# Patient Record
Sex: FEMALE | Race: WHITE | NOT HISPANIC OR LATINO | URBAN - METROPOLITAN AREA
[De-identification: names, ages, dates, MRNs, and addresses within clinical notes are randomized per-mention and may not be internally consistent; named-entity substitution may affect disease eponyms.]

---

## 2020-07-15 ENCOUNTER — OFFICE VISIT (OUTPATIENT)
Dept: URBAN - METROPOLITAN AREA CLINIC 54 | Facility: CLINIC | Age: 75
End: 2020-07-15

## 2020-08-13 ENCOUNTER — OFFICE VISIT (OUTPATIENT)
Dept: URBAN - METROPOLITAN AREA CLINIC 54 | Facility: CLINIC | Age: 75
End: 2020-08-13
Payer: MEDICARE

## 2020-08-13 ENCOUNTER — WEB ENCOUNTER (OUTPATIENT)
Dept: URBAN - METROPOLITAN AREA CLINIC 54 | Facility: CLINIC | Age: 75
End: 2020-08-13

## 2020-08-13 DIAGNOSIS — K58.9 IBS (IRRITABLE BOWEL SYNDROME) DIARRHEA PREDOMINANT: ICD-10-CM

## 2020-08-13 DIAGNOSIS — K63.5 COLON POLYPS: ICD-10-CM

## 2020-08-13 DIAGNOSIS — K22.70 BARRETTS ESOPHAGUS: ICD-10-CM

## 2020-08-13 PROCEDURE — 1036F TOBACCO NON-USER: CPT | Performed by: INTERNAL MEDICINE

## 2020-08-13 PROCEDURE — G8420 CALC BMI NORM PARAMETERS: HCPCS | Performed by: INTERNAL MEDICINE

## 2020-08-13 PROCEDURE — G8427 DOCREV CUR MEDS BY ELIG CLIN: HCPCS | Performed by: INTERNAL MEDICINE

## 2020-08-13 PROCEDURE — 99213 OFFICE O/P EST LOW 20 MIN: CPT | Performed by: INTERNAL MEDICINE

## 2020-08-13 PROCEDURE — 3017F COLORECTAL CA SCREEN DOC REV: CPT | Performed by: INTERNAL MEDICINE

## 2020-08-13 RX ORDER — SACCHAROMYCES BOULARDII 250 MG
CAPSULE ORAL
Qty: 0 | Refills: 0 | Status: ACTIVE | COMMUNITY
Start: 1900-01-01

## 2020-08-13 RX ORDER — ACETAMINOPHEN 500 MG/1
TABLET, FILM COATED ORAL
Qty: 0 | Refills: 0 | Status: ACTIVE | COMMUNITY
Start: 1900-01-01

## 2020-08-13 RX ORDER — BUDESONIDE 3 MG/1
CAPSULE, COATED PELLETS ORAL
Qty: 0 | Refills: 0 | Status: ACTIVE | COMMUNITY
Start: 1900-01-01

## 2020-08-13 RX ORDER — ZOLPIDEM TARTRATE 10 MG/1
TAKE 1 TABLET (10 MG) BY ORAL ROUTE ONCE DAILY AT BEDTIME TABLET, FILM COATED ORAL 1
Qty: 0 | Refills: 0 | Status: ACTIVE | COMMUNITY
Start: 1900-01-01

## 2020-08-13 RX ORDER — IPRATROPIUM BROMIDE 0.5 MG/2.5ML
SOLUTION RESPIRATORY (INHALATION)
Qty: 0 | Refills: 0 | Status: ACTIVE | COMMUNITY
Start: 1900-01-01

## 2020-08-13 RX ORDER — DICLOFENAC SODIUM 10 MG/G
GEL TOPICAL
Qty: 0 | Refills: 0 | Status: ACTIVE | COMMUNITY
Start: 1900-01-01

## 2020-08-13 RX ORDER — AMILORIDE HYDROCHLORIDE 5 MG/1
TAKE 1 TABLET (5 MG) BY ORAL ROUTE ONCE DAILY WITH FOOD TABLET ORAL 1
Qty: 0 | Refills: 0 | Status: ACTIVE | COMMUNITY
Start: 1900-01-01

## 2020-08-13 NOTE — HPI-TODAY'S VISIT:
This is a 74-year-old female who presents in follow-up for chronic diarrhea and history of Hernandez's esophagus.  She received notification from our office that an upper endoscopy was due.  He has had colon polyps in the past although her last colonoscopy in 2017 was clear of polyps and a follow-up colonoscopy recommended for 2023.  At this time she has no heartburn and is not taking acid reducing medication.  She has no dysphagia.  Her diarrhea is well controlled with cholestyramine.  She does have occasional episodes of urinary and once in a while fecal incontinence.  An InterStim device is been placed several years ago and replaced recently and is working well for both incontinence types.  She has had no rectal bleeding.  The colon biopsies and ileal biopsies were negative.  He does not use antidiarrheal drugs typically.  She does have a history of C. difficile on 2 occasions in the past and is undergone fecal transplant for management of this.  She has had no recurrence of C. difficile and has not recently been on antibiotics.  Her weight is been stable. She has a history of mitral stenosis and underwent bovine valve replacement a number of years ago.  She also has atrial fibrillation and is on chronic warfarin therapy.  She has interrupted warfarin in the past procedures.  She has no history of chest pain or congestive heart failure and does not have a cardiac pacemaker.  She has no history of coronavirus.

## 2020-08-13 NOTE — HPI-OTHER HISTORIES
>>prior visits  1/7/20-PATIENT PRESENTS FOR FOLLOW UP. SHE STATES SHE HAS INTERMITTENT CHEST PAIN LOCATED IN THE LEFT CHEST. PAIN LAST FEW MINUTES THEN GOES AWAY. IT IS INTERMITTENT. NO ASSOCIATED HEARTBURN OR INDIGESTION. DENIES S.O.B. DENIES ALARM SYMPTOMS.   June 4, 2018 Dr Lesvia Henao presents after being admitted at [facility] for her diarrhea when [facility] was closed on the weekend.  While admitted she developed constipation.  Stool work up done at the hosptial revealed negative for all pathogens and WBC.  She now feels that the diarrhea was due to stress related to caring for  who has multiple medical issues.  She now has formed explosive stools with mucous in the stool.  She relates her symptoms to dietary indiscretion with fatty dessert given her history of being post cholecystectomy.  She has been taking Florastor probiotic daily since 2012.  If she takes an antibiotic she doubles her dose.   May 24, 2018 Dr Lesvia Henao reports that today she had urgency with a green stool that was watery with no blood seen.  She has a history of resistant C. Diff.  She recently has taken prednisone for an eye infection with Z pack.  She takes Florastor daily.  She has also been on doxycycline.  She denies fever, chills, nausea or emesis.  She is on an albuterol inhaler due to bronchitis that has complicated her asthma.   FOLLOW UP 5/23/17- Dr. Tyler PATIENT PRESENTS FOR FOLLOW UP. SHE STATES HER SYMPTOMS ARE IMPROVED WITH IBGARD BID AND FLORASTOR. SHE ALSO NOTICED IMPROVEMENTS WHEN SHE USED THE FODMAP DIET. FOLLOW UP 2/23/17- Dr. Tyler PATIENT PRESENTS FOR FOLLOW UP. SHE IS DOING WELL. SHE HAD COLONOSCOPY WITH RANDOM BIOPSIES SHOWING NORMAL T.I. AND COLON. SHE HAD EXTERNAL HEMRRHOIDS. HER STOOL STUDIES (FECAL CALPROTECTIN, STOOL C.DIFF, STOOL C&S-ALL NORMAL).  1 /26/2017- Dr. Tyler The patient is a 71 year old /White female, who presents on referral from Steve Treadwell MD, for a gastroenterology evaluation for diarrhea which has been explosive , postprandial, and watery, and she has been having about 10 bowel movements per day when the symptoms occur. A copy of this document will be sent to the referring provider. The diarrhea has been present daily since it began approximately 5 years ago. No night time symptoms, but she does have accidents and urgency. Diarrhea is worse with eating and associated with tenesmus and mucus. She denies any alarm symptoms. She was recently in hospital with cardiac clearance with cath for chest pain. Besides diarrhea , the patient complains of no other GI tract problems. She has not traveled out of the country. The patient states no additional potential causes. Diagnostic Studies: Diagnostic Studies have not been done.

## 2022-01-19 ENCOUNTER — OFFICE VISIT (OUTPATIENT)
Dept: URBAN - METROPOLITAN AREA CLINIC 54 | Facility: CLINIC | Age: 77
End: 2022-01-19

## 2022-01-19 RX ORDER — ACETAMINOPHEN 500 MG/1
TABLET, FILM COATED ORAL
Qty: 0 | Refills: 0 | Status: ACTIVE | COMMUNITY
Start: 1900-01-01

## 2022-01-19 RX ORDER — SACCHAROMYCES BOULARDII 250 MG
CAPSULE ORAL
Qty: 0 | Refills: 0 | Status: ACTIVE | COMMUNITY
Start: 1900-01-01

## 2022-01-19 RX ORDER — IPRATROPIUM BROMIDE 0.5 MG/2.5ML
SOLUTION RESPIRATORY (INHALATION)
Qty: 0 | Refills: 0 | Status: ACTIVE | COMMUNITY
Start: 1900-01-01

## 2022-01-19 RX ORDER — ZOLPIDEM TARTRATE 10 MG/1
TAKE 1 TABLET (10 MG) BY ORAL ROUTE ONCE DAILY AT BEDTIME TABLET, FILM COATED ORAL 1
Qty: 0 | Refills: 0 | Status: ACTIVE | COMMUNITY
Start: 1900-01-01

## 2022-01-19 RX ORDER — AMILORIDE HYDROCHLORIDE 5 MG/1
TAKE 1 TABLET (5 MG) BY ORAL ROUTE ONCE DAILY WITH FOOD TABLET ORAL 1
Qty: 0 | Refills: 0 | Status: ACTIVE | COMMUNITY
Start: 1900-01-01

## 2022-01-19 RX ORDER — DICLOFENAC SODIUM 10 MG/G
GEL TOPICAL
Qty: 0 | Refills: 0 | Status: ACTIVE | COMMUNITY
Start: 1900-01-01

## 2022-01-19 RX ORDER — BUDESONIDE 3 MG/1
CAPSULE, COATED PELLETS ORAL
Qty: 0 | Refills: 0 | Status: ACTIVE | COMMUNITY
Start: 1900-01-01

## 2022-04-19 ENCOUNTER — TELEPHONE ENCOUNTER (OUTPATIENT)
Dept: URBAN - METROPOLITAN AREA CLINIC 54 | Facility: CLINIC | Age: 77
End: 2022-04-19

## 2022-04-21 ENCOUNTER — TELEPHONE ENCOUNTER (OUTPATIENT)
Dept: URBAN - METROPOLITAN AREA CLINIC 54 | Facility: CLINIC | Age: 77
End: 2022-04-21

## 2022-04-26 LAB — C DIFFICILE TOXINS A+B, EIA: NEGATIVE

## 2022-04-27 ENCOUNTER — OFFICE VISIT (OUTPATIENT)
Dept: URBAN - METROPOLITAN AREA CLINIC 54 | Facility: CLINIC | Age: 77
End: 2022-04-27
Payer: MEDICARE

## 2022-04-27 ENCOUNTER — WEB ENCOUNTER (OUTPATIENT)
Dept: URBAN - METROPOLITAN AREA CLINIC 54 | Facility: CLINIC | Age: 77
End: 2022-04-27

## 2022-04-27 DIAGNOSIS — K21.9 GERD (GASTROESOPHAGEAL REFLUX DISEASE): ICD-10-CM

## 2022-04-27 DIAGNOSIS — K58.9 IRRITABLE BOWEL SYNDROME: ICD-10-CM

## 2022-04-27 PROCEDURE — 99213 OFFICE O/P EST LOW 20 MIN: CPT | Performed by: INTERNAL MEDICINE

## 2022-04-27 RX ORDER — ACETAMINOPHEN 500 MG/1
TABLET, FILM COATED ORAL
Qty: 0 | Refills: 0 | Status: ACTIVE | COMMUNITY
Start: 1900-01-01

## 2022-04-27 RX ORDER — DICLOFENAC SODIUM 10 MG/G
GEL TOPICAL
Qty: 0 | Refills: 0 | Status: ACTIVE | COMMUNITY
Start: 1900-01-01

## 2022-04-27 RX ORDER — ZOLPIDEM TARTRATE 10 MG/1
TAKE 1 TABLET (10 MG) BY ORAL ROUTE ONCE DAILY AT BEDTIME TABLET, FILM COATED ORAL 1
Qty: 0 | Refills: 0 | Status: ACTIVE | COMMUNITY
Start: 1900-01-01

## 2022-04-27 RX ORDER — SACCHAROMYCES BOULARDII 250 MG
CAPSULE ORAL
Qty: 0 | Refills: 0 | Status: ACTIVE | COMMUNITY
Start: 1900-01-01

## 2022-04-27 RX ORDER — WARFARIN SODIUM 5 MG/1
1 TABLET TABLET ORAL ONCE A DAY
Status: ACTIVE | COMMUNITY

## 2022-04-27 RX ORDER — BUDESONIDE 3 MG/1
CAPSULE, COATED PELLETS ORAL
Qty: 0 | Refills: 0 | Status: ACTIVE | COMMUNITY
Start: 1900-01-01

## 2022-04-27 RX ORDER — IPRATROPIUM BROMIDE 0.5 MG/2.5ML
SOLUTION RESPIRATORY (INHALATION)
Qty: 0 | Refills: 0 | Status: ACTIVE | COMMUNITY
Start: 1900-01-01

## 2022-04-27 NOTE — PHYSICAL EXAM CONSTITUTIONAL:
well developed, well nourished , in no acute distress , ambulating without difficulty , normal communication ability
(0) No visual loss

## 2022-04-27 NOTE — HPI-TODAY'S VISIT:
76-year-old female with prior C. difficile and recent diarrhea that was fairly severe but has resolved with the addition of cholestyramine.  The odor and consistency was different than she had previously experienced with C. difficile.  She had to fecal transplants for C. difficile in the past.  Currently her bowels are moving regularly.  She is advised to take the Questran separately from her other medications.  She has no rectal bleeding.  Her last colonoscopy in 2017 was normal with no polyps although she had had polyps in the past.  Her last upper endoscopy in 2016 did not reveal Hernandez's esophagus and previously had been diagnosed.  A biopsy was also negative then.  She has no dysphagia and no heartburn.  Other medical problems include asthma that is fairly well controlled currently atrial fibrillation, antiphospholipid syndrome and mitral valve (bovine) she is on chronic warfarin therapy for these indications.  Her heart remained stable.

## 2022-05-02 ENCOUNTER — TELEPHONE ENCOUNTER (OUTPATIENT)
Dept: URBAN - METROPOLITAN AREA CLINIC 54 | Facility: CLINIC | Age: 77
End: 2022-05-02

## 2022-05-03 ENCOUNTER — TELEPHONE ENCOUNTER (OUTPATIENT)
Dept: URBAN - METROPOLITAN AREA CLINIC 92 | Facility: CLINIC | Age: 77
End: 2022-05-03

## 2022-10-17 ENCOUNTER — OFFICE VISIT (OUTPATIENT)
Dept: URBAN - METROPOLITAN AREA CLINIC 54 | Facility: CLINIC | Age: 77
End: 2022-10-17
Payer: MEDICARE

## 2022-10-17 ENCOUNTER — DASHBOARD ENCOUNTERS (OUTPATIENT)
Age: 77
End: 2022-10-17

## 2022-10-17 VITALS
WEIGHT: 152.4 LBS | DIASTOLIC BLOOD PRESSURE: 61 MMHG | SYSTOLIC BLOOD PRESSURE: 107 MMHG | HEIGHT: 61 IN | BODY MASS INDEX: 28.77 KG/M2 | TEMPERATURE: 97.4 F | HEART RATE: 74 BPM

## 2022-10-17 DIAGNOSIS — Z86.010 HISTORY OF COLON POLYPS: ICD-10-CM

## 2022-10-17 DIAGNOSIS — K21.9 GERD (GASTROESOPHAGEAL REFLUX DISEASE): ICD-10-CM

## 2022-10-17 DIAGNOSIS — K58.9 IRRITABLE BOWEL SYNDROME: ICD-10-CM

## 2022-10-17 DIAGNOSIS — R15.9 INCONTINENCE OF FECES, UNSPECIFIED FECAL INCONTINENCE TYPE: ICD-10-CM

## 2022-10-17 DIAGNOSIS — Z79.01 WARFARIN ANTICOAGULATION: ICD-10-CM

## 2022-10-17 DIAGNOSIS — R19.7 DIARRHEA, UNSPECIFIED TYPE: ICD-10-CM

## 2022-10-17 PROBLEM — 72042002: Status: ACTIVE | Noted: 2022-10-17

## 2022-10-17 PROBLEM — 428283002: Status: ACTIVE | Noted: 2022-10-17

## 2022-10-17 PROBLEM — 10191004: Status: ACTIVE | Noted: 2022-10-17

## 2022-10-17 PROCEDURE — 99214 OFFICE O/P EST MOD 30 MIN: CPT

## 2022-10-17 RX ORDER — DICLOFENAC SODIUM 10 MG/G
GEL TOPICAL
Qty: 0 | Refills: 0 | Status: ACTIVE | COMMUNITY
Start: 1900-01-01

## 2022-10-17 RX ORDER — SODIUM, POTASSIUM,MAG SULFATES 17.5-3.13G
TAKE 354 ML AS DIRECTED SOLUTION, RECONSTITUTED, ORAL ORAL
Qty: 354 ML | Refills: 0 | OUTPATIENT
Start: 2022-10-17 | End: 2022-10-18

## 2022-10-17 RX ORDER — WARFARIN SODIUM 5 MG/1
1 TABLET TABLET ORAL ONCE A DAY
Status: ACTIVE | COMMUNITY

## 2022-10-17 NOTE — HPI-TODAY'S VISIT:
4/27/22: 76-year-old female with prior C. difficile and recent diarrhea that was fairly severe but has resolved with the addition of cholestyramine.  The odor and consistency was different than she had previously experienced with C. difficile.  She had to fecal transplants for C. difficile in the past.  Currently her bowels are moving regularly.  She is advised to take the Questran separately from her other medications.  She has no rectal bleeding.  Her last colonoscopy in 2017 was normal with no polyps although she had had polyps in the past.  Her last upper endoscopy in 2016 did not reveal Hernandez's esophagus and previously had been diagnosed.  A biopsy was also negative then.  She has no dysphagia and no heartburn.  Other medical problems include asthma that is fairly well controlled currently atrial fibrillation, antiphospholipid syndrome and mitral valve (bovine) she is on chronic warfarin therapy for these indications.  Her heart remained stable.  10/17/22: Pt is a 78 yo female with PMH of antiphospholipid syndrome, DVT, and Afib on Warfarin, C diff, colon polyps, and Hernandez's resolved on last EGD who presents for EGD and colon consult. Pt states she has had some breakthrough acid reflux recently. Feels this may have been from eating chocolate. She does not currently take an anti reflux medication and controls GERD with diet. Her last EGD was over 5 years ago. Pt also states she is due for cscope as she has hx of polyps and last cscope was in 2017. Pt reports chronic diarrhea and urge incontinence of liquid feces at least once a week x 10 years. Cholestyramine helps. Seeing urologist for urinary incontinence. Denies melena, rectal bleeding, abdominal pain, or unintentional weight loss. No family history of colon cancer, no history of anesthesia problems. Recent labs showed leukopenia, seeing heme. Otherwise unremarkable.

## 2022-12-21 ENCOUNTER — TELEPHONE ENCOUNTER (OUTPATIENT)
Dept: URBAN - METROPOLITAN AREA CLINIC 54 | Facility: CLINIC | Age: 77
End: 2022-12-21

## 2023-01-10 ENCOUNTER — TELEPHONE ENCOUNTER (OUTPATIENT)
Dept: URBAN - METROPOLITAN AREA CLINIC 54 | Facility: CLINIC | Age: 78
End: 2023-01-10

## 2024-07-03 ENCOUNTER — TELEPHONE ENCOUNTER (OUTPATIENT)
Dept: URBAN - METROPOLITAN AREA CLINIC 54 | Facility: CLINIC | Age: 79
End: 2024-07-03